# Patient Record
Sex: MALE | Employment: FULL TIME | URBAN - METROPOLITAN AREA
[De-identification: names, ages, dates, MRNs, and addresses within clinical notes are randomized per-mention and may not be internally consistent; named-entity substitution may affect disease eponyms.]

---

## 2022-08-31 NOTE — PROGRESS NOTES
"MEDICAL WEIGHT LOSS INITIAL EVALUATION  DIAGNOSIS:  Class II Obesity    NUTRITION HISTORY:  Breakfast: coffee + cream  Lunch: [place] or low calorie frozen entree@ noon-1  Dinner: [employer] 5X per week steak or walleye, baked potato, salad with blue cheese dressing-biggest meal of the day, dessert a few X per week @ 7 pm  Snacks: a few X per week-crackers/ cheese or strawberries  Beverage choices: 34 oz water, 2-3 cups coffee + cream, 1 can diet pop, 6-8 oz OJ, stopped ETOH 2 years ago  Dining out: [employer]-5X per week, 5X per month clients, 3X per month dines out  Binge eating: no  Emotional eating: no  Night time eating: no  Exercise: none, but has a bike and has a gym membership at [employer]  Other: Wife does all of the shopping (online) and cooking. She cooks 2 dinners and orders 5 meal per week form [employer] (for ~ 3 years) due to declining health. He has gained ~ 15 pounds in the past 3 years. Patient does not feel like they could do a \"\"meal kit\" program. 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 He need to lose ~50 pounds to get a knee replacement. He tried eating 1 meal/day and lost ~ 20 pounds, but felt it was not sustainable to be starving all day.      MEDICATION FOR WEIGHT LOSS:  Plans to start Topamax    ANTHROPOMETRICS:  Height: 6'  Weight: 275.3 lb   BMI: 37.34 kg/m2  NUTRITION DIAGNOSIS:   Obese, class II related to excess energy intake as evidence by BMI of  37.34  NUTRITION INTERVENTIONS  Nutrition Prescription:  Recommend modified energy- nutrient intake  Implementation:  Nutrition Education (Content):    Discussed my plate guidelines    Determined alternative to emotional eating    Reviewed  beverage choices    Provided: My Plate guidelines, Tips for Weight loss/ management, guidelines for selecting protein drinks      Nutrition Education (Application):     Patient to practice goals as stated below    Patient verbalizes understanding of " weight loss by wanting to reduce portions at dinner    Anticipate good compliance    Goals:  Increase water to 48-64 oz per day  Share an entree at dinner with spouse (pt likes this idea)  Have breakfast daily or a protein drink (try as replacement for cream in coffee)  Limit juice to no more than 4 oz per day        FOLLOW UP AND MONITORING:    Other  - follow up in 4 weeks.     TIME SPENT WITH PATIENT:   34 minutes   Pool Almaraz RD, LIN  [facility]

## 2022-09-09 ENCOUNTER — TELEPHONE (OUTPATIENT)
Dept: SURGERY | Facility: CLINIC | Age: 72
End: 2022-09-09

## 2022-09-09 NOTE — TELEPHONE ENCOUNTER
Sent link to email to get set up on [employer]    Left voicemail message for patient reminding them to complete the new patient questionnaire before their appointment.    Chasidy BAR MA

## 2022-09-12 ENCOUNTER — OFFICE VISIT (OUTPATIENT)
Dept: SURGERY | Facility: CLINIC | Age: 72
End: 2022-09-12
Payer: COMMERCIAL

## 2022-09-12 ENCOUNTER — ALLIED HEALTH/NURSE VISIT (OUTPATIENT)
Dept: SURGERY | Facility: CLINIC | Age: 72
End: 2022-09-12
Payer: COMMERCIAL

## 2022-09-12 VITALS
DIASTOLIC BLOOD PRESSURE: 92 MMHG | WEIGHT: 275.3 LBS | OXYGEN SATURATION: 100 % | SYSTOLIC BLOOD PRESSURE: 138 MMHG | HEIGHT: 72 IN | BODY MASS INDEX: 37.29 KG/M2 | HEART RATE: 74 BPM

## 2022-09-12 DIAGNOSIS — R73.03 PREDIABETES: ICD-10-CM

## 2022-09-12 DIAGNOSIS — G47.33 OSA (OBSTRUCTIVE SLEEP APNEA): ICD-10-CM

## 2022-09-12 DIAGNOSIS — K21.9 GASTROESOPHAGEAL REFLUX DISEASE, UNSPECIFIED WHETHER ESOPHAGITIS PRESENT: ICD-10-CM

## 2022-09-12 DIAGNOSIS — R63.5 WEIGHT GAIN: ICD-10-CM

## 2022-09-12 DIAGNOSIS — I10 ESSENTIAL HYPERTENSION: ICD-10-CM

## 2022-09-12 DIAGNOSIS — E66.01 MORBID OBESITY (H): Primary | ICD-10-CM

## 2022-09-12 DIAGNOSIS — E78.5 HYPERLIPIDEMIA, UNSPECIFIED HYPERLIPIDEMIA TYPE: ICD-10-CM

## 2022-09-12 PROCEDURE — 99205 OFFICE O/P NEW HI 60 MIN: CPT | Performed by: PHYSICIAN ASSISTANT

## 2022-09-12 PROCEDURE — 97802 MEDICAL NUTRITION INDIV IN: CPT | Performed by: DIETITIAN, REGISTERED

## 2022-09-12 RX ORDER — POLYETHYLENE GLYCOL 3350 17 G/17G
17 POWDER, FOR SOLUTION ORAL
COMMUNITY
Start: 2022-05-19

## 2022-09-12 RX ORDER — ATORVASTATIN CALCIUM 40 MG/1
TABLET, FILM COATED ORAL
COMMUNITY
Start: 2022-05-19

## 2022-09-12 RX ORDER — ASPIRIN 325 MG
325 TABLET, DELAYED RELEASE (ENTERIC COATED) ORAL
COMMUNITY

## 2022-09-12 RX ORDER — SILDENAFIL CITRATE 20 MG/1
20-60 TABLET ORAL
COMMUNITY
Start: 2022-05-19

## 2022-09-12 RX ORDER — MULTIPLE VITAMINS W/ MINERALS TAB 9MG-400MCG
1 TAB ORAL
COMMUNITY

## 2022-09-12 RX ORDER — METOPROLOL SUCCINATE 25 MG/1
25 TABLET, EXTENDED RELEASE ORAL
COMMUNITY
Start: 2022-05-19

## 2022-09-12 RX ORDER — TOPIRAMATE 25 MG/1
TABLET, FILM COATED ORAL
Qty: 90 TABLET | Refills: 1 | Status: SHIPPED | OUTPATIENT
Start: 2022-09-12 | End: 2022-12-12

## 2022-09-12 RX ORDER — LOSARTAN POTASSIUM 100 MG/1
100 TABLET ORAL
COMMUNITY
Start: 2022-05-19

## 2022-09-12 RX ORDER — IBUPROFEN 800 MG/1
800 TABLET, FILM COATED ORAL
COMMUNITY

## 2022-09-12 RX ORDER — ESCITALOPRAM OXALATE 10 MG/1
TABLET ORAL
COMMUNITY
Start: 2022-05-19

## 2022-09-12 ASSESSMENT — LIFESTYLE VARIABLES
AUDIT-C TOTAL SCORE: 0
HOW MANY STANDARD DRINKS CONTAINING ALCOHOL DO YOU HAVE ON A TYPICAL DAY: PATIENT DOES NOT DRINK
HOW OFTEN DO YOU HAVE SIX OR MORE DRINKS ON ONE OCCASION: NEVER
HOW OFTEN DO YOU HAVE A DRINK CONTAINING ALCOHOL: NEVER
SKIP TO QUESTIONS 9-10: 1

## 2022-09-12 NOTE — PATIENT INSTRUCTIONS
"Nice to talk with you today. Thank you for allowing me the privilege of caring for you. We hope we provided you with the excellent service you deserve.     To ensure the quality of our services you may receive a patient satisfaction survey from an independent monitoring company.  The greatest compliment you can give is \"Likely to Recommend\"    Below is our plan we discussed.-  EITAN Pérez      Plan:  Start Topiramate 25 mg tablets  1 tablet at night  in week 1  2 tablets at night in week 2  3 tablets at night in week 3 and beyond    Labs have been ordered for you. You can have these drawn at any [facility] lab.  Please call [phone] to set up a lab appt. Results will post to [employer] when complete.  Once all completed, I will review, and write you a note explaining what we find.      Goals:  Talk with wife about changing work schedule.    Start work earlier   Work out in afternoon    FOLLOW-UP:    Please call [phone] to schedule your next visit in 8- 10 weeks with Elisa Funez PA-C      TOPIRAMATE (TOPAMAX)    We are considering starting topiramate at bedtime.  Start one tab, 25 mg, for a week. Go up to 50 mg (2 tabs) for the next week. At the third week, take  3 tabs (75 mg).  Stay at 3 tabs until you are seen again.       Topiramate (Topamax) is a medication that is used most often to treat migraine headaches or for seizures. It has also been found to help with weight loss. Although it's not currently FDA approved for weight loss, it has been used safely for a number of years to help people who are carrying extra weight.     Just how topiramate helps with weight loss has not been exactly determined. However it seems to work on areas of the brain to quiet down signals related to eating.      Topiramate may make you:    >feel less interest in eating in between meals   >think less about food and eating   >find it easier to push the plate away   >find giving up pop easier    >have an easier time eating " less    For some of our patients, the pills work right away. They feel and think quite differently about food. Other patients don't feel much of a change but find in fact they have lost weight! Like all weight loss medications, topiramate works best when you help it work.  This means:    1) Have less tempting high calorie (fattening) food around the house or office    2) Have lower calorie food (fruits, vegetables,low fat meats and dairy) for snacks    3) Eat out only one time or less each week.   4) Eat your meals at a table with the TV or computer off.    Side-effects. Topiramate is generally well tolerated. The main side-effects we see are:   Tingling in hands,feet, or face (usually not very troublesome)   Mental confusion and word finding trouble (about 10% of patients have this.)     Feeling sleepy or a bit dopey- this goes away very soon after starting.      For any questions or concerns please send a [employer] message to our team or call our weight management call center at [phone] during regular business hours. For questions during evenings or weekends your messages will be addressed during the next business day.  For emergencies please call 911 or seek immediate medical care.     (Do not stop taking it if you don't think it's working. For some people it works even though they do not feel much different.)

## 2022-09-12 NOTE — PROGRESS NOTES
"New Medical Weight Management Consult    PATIENT:  Franco Wheeler   MRN:         [id number]   :         1950  MATT:         2022      Dear Lindy Purdy MD,    I had the pleasure of seeing your patient, Franco Wheeler. Full intake/assessment was done to determine barriers to weight loss success and develop a treatment plan. Franco Wheeler is a 71 year old male interested in treatment of medical problems associated with excess weight. He has a height of 6' 0\", a weight of 275 lbs 4.8 oz, and the calculated Body mass index is 37.34 kg/m .    Assessment & Plan   Problem List Items Addressed This Visit       Essential hypertension    Relevant Medications    losartan (COZAAR) 100 MG tablet    Other Relevant Orders    Comprehensive metabolic panel [LAB17] (Future)    Morbid obesity (H) - Primary    Relevant Medications    topiramate (TOPAMAX) 25 MG tablet    Other Relevant Orders    Comprehensive metabolic panel [LAB17] (Future)    Vitamin D Deficiency [HUS774] (Future)    Hemoglobin A1c [LAB90] (Future)    TSH with free T4 reflex    Hyperlipidemia, unspecified hyperlipidemia type    Relevant Medications    atorvastatin (LIPITOR) 40 MG tablet    Other Relevant Orders    Comprehensive metabolic panel [LAB17] (Future)    Gastroesophageal reflux disease, unspecified whether esophagitis present    Relevant Medications    omeprazole (PRILOSEC) 20 MG DR capsule    polyethylene glycol (MIRALAX) 17 GM/Dose powder    Prediabetes    Relevant Orders    Hemoglobin A1c [LAB90] (Future)    MARTI (obstructive sleep apnea)          Other Visit Diagnoses       Weight gain        Relevant Orders    TSH with free T4 reflex             PROGRAM OVERVIEW  Reviewed options at [facility] including provider visits and the dietician.  All questions about weight loss program were answered.    SURGICAL WEIGHT LOSS   Option presented given pt BMI and current comorbid conditions. No current interest.   " "  MEDICATIONS:  We discussed healthy habits to assist with weight loss. We reviewed medications associated with weight gain. We discussed the role of pharmacological agents in the treatment of obesity and the \"off-label\" use of medications in this practice. We reviewed medication that may assist with weight loss. Indications, contraindications, risks/benefits, and potential side effects were discussed.   Topiramate was prescribed. Discussed that medications must always be used together with lifestyle changes such as improvements in diet choices, portion control and establishing and maintaining a regular exercise program.     AOM:  Phentermine:  Pt has hx of arrhythmia.    Topiramate:  Will start today  GLP-1:    AOM not covered.  Will get lab today.   Naltrexone:  Consideration, Hx of alcohol abuse  Wellbutrin:  Hx of alcohol abuse, would avoid due to increase seizure threshold  Metformin:  Will get lab doday  Contrave: AOM not covered.  Qsymia: AOM not covered.          CURRENT GOALS:  (allow time to get activity back into daily routine)  Talk with wife about changing work schedule.    Start work earlier   Work out in afternoon     Restart BP medications  Return to sleep clinic    Follow up: Please call [phone] to schedule your next visit in 8- 10 weeks with Elisa Funez PA-C    72 minutes spent on the date of the encounter doing chart review, history and exam, review test results, counseling, developing plan of care, documentation, and further activities as noted above.       Weight Related Co-morbidities:          I have the following health issues associated with obesity: HTN, High Cholesterol, MARTI   I have the following symptoms associated with obesity: Knee Pain     Patient Active Problem List   Diagnosis    Essential hypertension    Morbid obesity (H)    Hyperlipidemia, unspecified hyperlipidemia type    Gastroesophageal reflux disease, unspecified whether esophagitis present    Prediabetes    MARTI " "(obstructive sleep apnea)       Weight History    Previously had weight loss surgery: no   The following factors contributed to weight gain:    Eating habits, decrease in exercise   I have tried the following methods to lose weight:   Physician directed weight loss- counting calories, weight loss medication, exercise   My lowest weight since age 17 was: 300   My highest weight since age 17 was: 210   The most weight I have ever lost was: (lbs) 25 lb   Orthopedic surgeon wants him to lose 50 lbs in order to get knee surgery.   He did physician directed weight loss in past with Nichelle Lazaro.  Lost about 25 lbs.  Interested in doing this again.   For the last 3 months he has been dieting on his own he lost about 15-20 lbs and gained about 5 back.   Would like to lose more and get to 190.  He has HTN and \"thickening of his heart\" and a knee problem.      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  Son works with him as does his wife but they work part time.  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  Would like to lose weight to get back to this.     During Covid had increase alcohol use and Alcohol Use Disorder. PCP intervention performed. Read AA blue book.  Has been alcohol free for the last 2 years.         Diet Recall     Wake Up: 7:00   Breakfast Coffee with Cream 7:00 am  100 aclories   Lunch 1000 calories    Supper; Yogurt    Bedtime:   Snack between meals:   Snack in evening:    10:00             Typical snacks:    Caloric beverages per day. What type: Juice- OJ 1 glass   Water consumed daily: none   Low heydi/diet drinks:   Alcohol:   Foods you crave:    1 diet root beer   None from last 2 years   None (does eat sweets because they are in the house)      Wife is type 1 Diabetic so she has Crackers, juice around in case she needs these for lows.      In the last 2-3 weeks increased his calories to " 2500 calories was eating 2 meals a day Lunch and dinner and then having sweets.        Sleep History    How many hours do you sleep at night? 9 hours   Do you think you have sleep apnea?  Previously    Do you snore so loudly some one can hear you through a closed door? Yes   Has anyone seen or heard you stop breathing during your sleep?  Not since stopping alcohol   Do you often feel tired, fatigued, or sleepy during the day? Not       Eating Habits (Yes/No) (Never, Daily, Several Days, Weekly,Monthly)   Eat fast food  4x a week      Eat Take out/sit-down restaurant. 5x a week      Eat most meals in front of screens Always      Skip meals Yes   Grazes all day Modest Grazer      Snacks between meals. No      Eat most food at end of day. Yes, usually dinner with wife      Eat in middle of night  No      Eat  to prevent or correct low blood sugar. No      Eat to prevent GERD No      Worry about not having enough food to eat. No      Constant Dieter No      I emotionally eat. (stressed, depressed, anxious, bored) No      I feel hungry all the time-even if I just have eaten. No      Feeling full is important to me. Yes      I finish all the food on my plate-even if I am already full. Yes      I eat/snack without noticing that I am eating. No      I eat when I am preparing the meal. No      I have trouble not eating junk if they are around the house. Yes      I think about food all day. No      Who does the grocery shopping? Wife   Who does the cooking? Wife 2 days home cooking, 5 days take out       Eating Disorder Screen    I binge eat No      I make myself vomit what I have eaten or use laxatives to get rid of food. No   I eat a large amount of food, like a loaf of bread, a box of cookies, a pint/quart of ice cream, all at once. No   I eat a large amount of food even when I am not hungry. No   I eat rapidly. No   I eat alone because I feel embarrassed and do not want others to see how much I have eaten. No   I eat  until I am uncomfortably full. No   I feel bad, disgusted, or guilty after I overeat. No   I make myself vomit what I have eaten or use laxatives to get rid of food. No       Activity/Exercise History    How much of a typical 12 hour day do you spend sitting or lying down?    How many hours of screen time do you have daily?    How many times a week are you active for the purpose of exercise? None   What do you do for exercise? None   For how long to your exercise for?    What keeps you from being more active? Too Busy due to work., Knee pain     Bike works best because it is pain free and fun.  He rides on the bike path.      No past medical history on file.       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 All day at work    How many hours do you spend commuting to work daily?  20 minutes twice daily   What is your marital status?    If in a relationship, is your significant other overweight?    Do you have children? Yes   If you have children, are they overweight?    Who do you live with?  Wife, 3 dogs   Are they supportive of your health goals?    Who does the food shopping?       Social History     Tobacco Use    Smoking status: Never Smoker    Smokeless tobacco: Never Used   Substance Use Topics    Alcohol use: No     , none currently for the past 2 years.     Drug use: No        Mental Health History Reviewed With Patient    How often in the past 2 weeks have you felt little interest or pleasure in doing things? None   Over the past 2 weeks how often have you felt down, depressed, or hopeless? Seldom       MEDICATIONS:   Current Outpatient Medications   Medication    aspirin (ASA) 325 MG EC tablet    atorvastatin (LIPITOR) 40 MG tablet    escitalopram (LEXAPRO) 10 MG tablet    ibuprofen (ADVIL/MOTRIN) 800 MG tablet    losartan (COZAAR) 100 MG tablet    metoprolol  succinate ER (TOPROL XL) 25 MG 24 hr tablet    multivitamin w/minerals (THERA-VIT-M) tablet    nirmatrelvir and ritonavir (PAXLOVID) therapy pack    omeprazole (PRILOSEC) 20 MG DR capsule    polyethylene glycol (MIRALAX) 17 GM/Dose powder    sildenafil (REVATIO) 20 MG tablet    topiramate (TOPAMAX) 25 MG tablet     No current facility-administered medications for this visit.        ALLERGIES:      Allergies   Allergen Reactions    Penicillins         ROS:    HEENT  H/O glaucoma:  no  Cardiovascular  CAD:   no  Palpitations:   yes  HTN:    Yes, has been out of medication for last week.    Gastrointestinal  GERD:   yes  Constipation:   Yes, controlled with fiber  Liver Dz:   no  H/O Pancreatitis:  no  H/O Gallbladder Dz: no  Psychiatric  Moods Stable:  yes  Anxiety:   no  Depression:  yes  Bipolar:  no  H/O ETOH/Drug Use yes  H/O eating disorder: no  Endocrine  PMH/FMH of MTC or MEN2:  no  Neurologic:  H/O seizures:   no  Headaches:  no  Memory Impairment:  no    H/O kidney stones:  no  Kidney disease:  no    Labs/Records Reviewed:  No results found for: A1C, VITDT, TSH, NA, POTASSIUM, CHLORIDE, CO2, ANIONGAP, GLC, BUN, CR, GFRESTIMATED, EVELIN, BILITOTAL, ALKPHOS, ALT, AST, CHOL, HDL, LDL, TRIG, WBC, HGB, HCT, MCV, PLT      PHYSICAL EXAM:  BP (!) 138/92   Pulse 74   Ht 6' (1.829 m)   Wt 275 lb 4.8 oz (124.9 kg)   SpO2 100%   BMI 37.34 kg/m    GENERAL: Healthy, alert and no distress  EYES: Eyes grossly normal to inspection.  No discharge or erythema, or obvious scleral/conjunctival abnormalities.  RESP: No audible wheeze, cough, or visible cyanosis.  No visible retractions or increased work of breathing.    SKIN: Visible skin clear. No significant rash, abnormal pigmentation or lesions.  NEURO: Cranial nerves grossly intact.  Mentation and speech appropriate for age.  PSYCH: Mentation appears normal, affect normal/bright, judgement and insight intact, normal speech and appearance well-groomed.    COUNSELING:    Reviewed obesity as a chronic disease and comprehensive management stratagies.      We discussed Bariatric Basics including:  -eating 3 meals daily  -eating protein first  -eating slowly, chewing food well  -avoiding/limiting calorie containing beverages  -limiting carbohydrates and changing to whole grains  -limiting restaurant or cafeteria eating to twice a week or less    We discussed the importance of restorative sleep and stress management in maintaining a healthy weight.  We discussed insulin resistance and glycemic index as it relates to appetite and weight control.   We discussed the importance of physical activity including cardiovascular and strength training in maintaining a healthier weight and explored viable options.  Patient education of above written in AVS.      Sincerely,      Elisa Funez PA-C

## 2022-10-25 ENCOUNTER — LAB (OUTPATIENT)
Dept: LAB | Facility: CLINIC | Age: 72
End: 2022-10-25
Payer: COMMERCIAL

## 2022-10-25 DIAGNOSIS — R73.03 PREDIABETES: ICD-10-CM

## 2022-10-25 DIAGNOSIS — I10 ESSENTIAL HYPERTENSION: ICD-10-CM

## 2022-10-25 DIAGNOSIS — E66.01 MORBID OBESITY (H): ICD-10-CM

## 2022-10-25 DIAGNOSIS — R63.5 WEIGHT GAIN: ICD-10-CM

## 2022-10-25 DIAGNOSIS — E78.5 HYPERLIPIDEMIA, UNSPECIFIED HYPERLIPIDEMIA TYPE: ICD-10-CM

## 2022-10-25 LAB — HBA1C MFR BLD: 5.8 % (ref 0–5.6)

## 2022-10-25 PROCEDURE — 80053 COMPREHEN METABOLIC PANEL: CPT

## 2022-10-25 PROCEDURE — 36415 COLL VENOUS BLD VENIPUNCTURE: CPT

## 2022-10-25 PROCEDURE — 83036 HEMOGLOBIN GLYCOSYLATED A1C: CPT

## 2022-10-25 PROCEDURE — 84443 ASSAY THYROID STIM HORMONE: CPT

## 2022-10-25 PROCEDURE — 82306 VITAMIN D 25 HYDROXY: CPT

## 2022-10-26 LAB
ALBUMIN SERPL-MCNC: 3.8 G/DL (ref 3.4–5)
ALP SERPL-CCNC: 76 U/L (ref 40–150)
ALT SERPL W P-5'-P-CCNC: 28 U/L (ref 0–70)
ANION GAP SERPL CALCULATED.3IONS-SCNC: 7 MMOL/L (ref 3–14)
AST SERPL W P-5'-P-CCNC: 16 U/L (ref 0–45)
BILIRUB SERPL-MCNC: 0.4 MG/DL (ref 0.2–1.3)
BUN SERPL-MCNC: 17 MG/DL (ref 7–30)
CALCIUM SERPL-MCNC: 8.6 MG/DL (ref 8.5–10.1)
CHLORIDE BLD-SCNC: 110 MMOL/L (ref 94–109)
CO2 SERPL-SCNC: 24 MMOL/L (ref 20–32)
CREAT SERPL-MCNC: 0.84 MG/DL (ref 0.66–1.25)
DEPRECATED CALCIDIOL+CALCIFEROL SERPL-MC: 27 UG/L (ref 20–75)
GFR SERPL CREATININE-BSD FRML MDRD: >90 ML/MIN/1.73M2
GLUCOSE BLD-MCNC: 94 MG/DL (ref 70–99)
POTASSIUM BLD-SCNC: 4 MMOL/L (ref 3.4–5.3)
PROT SERPL-MCNC: 7.5 G/DL (ref 6.8–8.8)
SODIUM SERPL-SCNC: 141 MMOL/L (ref 133–144)
TSH SERPL DL<=0.005 MIU/L-ACNC: 1.21 MU/L (ref 0.4–4)

## 2022-10-27 NOTE — RESULT ENCOUNTER NOTE
Pavel Gamez,    I enjoyed meeting you.  I reviewed your lab results.        Pavel Gamez,    I enjoyed meeting you.  I reviewed your lab results.      Your Vitamin D is low.  This means you might be having trouble getting your calcium absorbed from you food.  Please start 2000 Int Units daily.      Your TSH shows your thyroid function is normal.    Your CMP shows your kidney function, electrolytes, glucose level, and liver function is normal.     Your HgA1C shows your blood sugars are high and are causing prediabetes.  Prediabetes puts you at increased risk of developing type II diabetes.     Insulin is a hormone made by your pancreas that acts like a key to let blood sugar into cells for use as energy. If you have prediabetes, the cells in your body don't respond normally to insulin. Your pancreas makes more insulin to try to get cells to respond. Eventually your pancreas can't keep up, and your blood sugar rises, setting the stage for prediabetes-and type 2 diabetes down the road.    What this means for your weight?  With insulin resistance your body has a hard time absorbing that sugar and utilizing it as fuel. Instead that glucose (sugar) get pushed to your fat cells and stored there. As you continue to eat when you insulin resistant, that fuel continually gets stored as fat. Also, your pancreas needs to work harder to pump out more insulin to get your blood sugars down. More insulin means more fat storage.     There are medications we can use that target insulin resistance and also preserve the cells that make insulin.  They also can help with weight loss.  These are metformin and a class of medications called GLP-1 agonists.  We can talk about these at your next visit.     Stay safe, stay well.    EITAN Pérez    * For additional lab test information, labtestsonline.org is an good reference

## 2022-11-05 NOTE — PROGRESS NOTES
MEDICAL WEIGHT LOSS FOLLOW UP      DIAGNOSIS:  Class II Obesity    NUTRITION HISTORY:    Breakfast: Muscle milk     Lunch:  Take out 5X per week-salad and bowl chili or 3 tacos or omelette with toast     Dinner:  Cat fish, red beans or 2 slices cheese, apple     Snacks:  will eat candy (Halloween) if it is around, but generally no snacking     Beverage Choices:  2-16.9 oz Water, 3 cups of coffee, 1 protein drink, 12 oz diet or regular, 12 oz diet 7 up, occasional orange juice every other day     Exercise: stationary bike 3X per week-30 minutes     Other: Working to get weight down to 230 to have a knee replacement. Patient says never gets thirsty for water, but thinks if he cuts back on pop he can drink more water. Pt want to start brining a lunch from home daily. He is trying to eat smaller portions at dinner and has cut way back on dining out for dinner.     ANTHROPOMETRICS:    Initial Weight: 275.3 pounds    Height: 6'    Current Weight: 269.1 pounds    Weight Change:  decrease 6.2 pounds    BMI: 36.50 kg/m2    MEDICATIONS:  Topamax (went down to 1 tablet-plans to discuss this with he provider at December visit)    EVALUATION/PROGRESS TOWARDS GOALS:  Previous Goals:  Increase water to 48-64 oz per day-not met  Share an entree at dinner with spouse (pt likes this idea)-met/ quit take out meals for dinner  Have breakfast daily or a protein drink (try as replacement for cream in coffee)-met  Limit juice to no more than 4 oz per day-met      Previous Nutrition Diagnosis:  Obese class II related to excess energy intake as evidence by BMI of 37.34 kg/m2     Current Nutrition Diagnosis:   Obese class II related to excess energy intake as evidence by BMI of 36.50 kg/m2  No change      INTERVENTION:    Nutrition Prescription:  Recommend modified nutrient intake by decreasing energy intake    Implementation:    Meals and Snacks: 3/0-1    Nutrition Education (Content):    Discussed previous goals and determined new  goals    Encourage physical activity    Supported patient in attempted weight loss and behavior changes     Congratulated patient on successful weight loss     Patient verbalizes understanding of weight loss by wanting to cut back on dinining out further.    Anticipate fair-good compliance    Goals:  Bring lunch to work daily  Limit pop to 2 servings/ week  Increase water to 50 oz per day      Follow Up/Monitoring:  Other  -  patient to follow up in 8 weeks    Time Spent With Patient:  26 Minutes  Pool Almaraz RD, LD  [facility]

## 2022-11-08 ENCOUNTER — ALLIED HEALTH/NURSE VISIT (OUTPATIENT)
Dept: SURGERY | Facility: CLINIC | Age: 72
End: 2022-11-08
Payer: COMMERCIAL

## 2022-11-08 VITALS — BODY MASS INDEX: 36.5 KG/M2 | WEIGHT: 269.1 LBS

## 2022-11-08 PROCEDURE — 97803 MED NUTRITION INDIV SUBSEQ: CPT | Performed by: DIETITIAN, REGISTERED

## 2022-12-08 NOTE — PROGRESS NOTES
2022    Return Medical Weight Management Note     Franco Wheeler  MRN:  [id number]  :  1950      Dear Lindy Purdy,    I had the pleasure of doing a visit with your patient Franco Wheeler.  He is a 72 year old male who I am continuing to see for weight management.     Assessment & Plan   Problem List Items Addressed This Visit     Morbid obesity (H) - Primary     Patient was congratulated on wt loss success thus far. Healthy habits to assist with further weight loss were discussed. Franco will continue the Topiramate but increase his dose to 25 mg twice daily.  WE dicussed phentermine today.  With his RBBB I am not sure he is a candidate.  Will send note to cardiologist to ask opinion. Pt has pre-diabetes and would most likely benefit from metformin addition.  Will consider at follow up visit or if phentermine not a possibility.     Goals:   Will take lunch to work  Will eat supper  Will work on plate method for eating.           Relevant Medications    topiramate (TOPAMAX) 25 MG tablet    Prediabetes     May improve with weight loss.  Will consider metformin as add on medication if needed.               PATIENT INSTRUCTIONS:  Increase Topiramate to 25 mg twice daily.     Goals:  Will take lunch to work  Will eat supper  Will work on plate method for eating.  Will increase activity from 1 hour 5x day a week of cardio with 15 minutes to weight.      FOLLOW-UP:    Please call [phone] to schedule your next visit in 3 months.  See dietician this month.       34 minutes spent on the date of the encounter doing chart review, history and exam, result review, counseling, developing plan of care, documentation, and further activities as noted      INTERVAL HX: Franco returns for medical weight management follow up.  Last seen in 2022.  Was started on Topiramate.  Was going to Restart BP medications and Return to sleep clinic.  Ramped up to 75 mg of Topiramate but had some side effects  so decreased to 25 mg at night.  He has less snacking. Is not having sweets after dinner.  (Did have the halloween candy but that is now out of the house.)  Is having a significant amount of less soda.       Wants to get down to 230 lbs to have his knee surgery.    Goals:   Work out in afternoon - completed    Diet Recall  Breakfast: Has muscle milk in working  Take out at lunch: Salad with meat or Steak and Eggs from [employer]  Supper:  Tried to avoid supper.  Potato sausage and mashed potato.  Small charles salad.     WEIGHT METRICS:  Body mass index is 35.87 kg/m .   Current Weight: 264 lb 8 oz (120 kg)  Last Visits Weight: 275 lb 4.8 oz (124.9 kg)  Initial Weight (lbs): 275.3 lbs  Cumulative weight loss (lbs): 10.8  Weight Loss Percentage: 3.92%    Wt Readings from Last 10 Encounters:   12/12/22 264 lb 8 oz (120 kg)   11/08/22 269 lb 1.6 oz (122.1 kg)   09/12/22 275 lb 4.8 oz (124.9 kg)        Weight Loss Medication (AOM) History Reviewed       Current weight loss medication/s taking:    Topiramate   If not taking an AOM prescribed to you, please indicate why: On 75 mg dose has SE so decreased to 25 mg dose.    Any side effects from the medication?   A little more irritable according to wife.         Changes and Difficulties      Diet changes since last visit:   Having muscle milk in the morning.    With regards to diet, pt still struggling with:   Always having night meal, eating too much at lunch   Activity changes since last visit:   5 days a week ofr cardio with 15 min of weight after   With regards to activity, pt still struggling with: None       MEDICATIONS:   Current Outpatient Medications   Medication     aspirin (ASA) 325 MG EC tablet     atorvastatin (LIPITOR) 40 MG tablet     escitalopram (LEXAPRO) 10 MG tablet     ibuprofen (ADVIL/MOTRIN) 800 MG tablet     losartan (COZAAR) 100 MG tablet     metoprolol succinate ER (TOPROL XL) 25 MG 24 hr tablet     multivitamin w/minerals (THERA-VIT-M) tablet      nirmatrelvir and ritonavir (PAXLOVID) therapy pack     omeprazole (PRILOSEC) 20 MG DR capsule     polyethylene glycol (MIRALAX) 17 GM/Dose powder     sildenafil (REVATIO) 20 MG tablet     topiramate (TOPAMAX) 25 MG tablet     No current facility-administered medications for this visit.       LABS:  Hemoglobin A1C   Date Value Ref Range Status   10/25/2022 5.8 (H) 0.0 - 5.6 % Final     Comment:     Normal <5.7%   Prediabetes 5.7-6.4%    Diabetes 6.5% or higher     Note: Adopted from ADA consensus guidelines.     Vitamin D, Total (25-Hydroxy)   Date Value Ref Range Status   10/25/2022 27 20 - 75 ug/L Final     Sodium   Date Value Ref Range Status   10/25/2022 141 133 - 144 mmol/L Final     Potassium   Date Value Ref Range Status   10/25/2022 4.0 3.4 - 5.3 mmol/L Final     Chloride   Date Value Ref Range Status   10/25/2022 110 (H) 94 - 109 mmol/L Final     Carbon Dioxide (CO2)   Date Value Ref Range Status   10/25/2022 24 20 - 32 mmol/L Final     Anion Gap   Date Value Ref Range Status   10/25/2022 7 3 - 14 mmol/L Final     Glucose   Date Value Ref Range Status   10/25/2022 94 70 - 99 mg/dL Final     Urea Nitrogen   Date Value Ref Range Status   10/25/2022 17 7 - 30 mg/dL Final     Creatinine   Date Value Ref Range Status   10/25/2022 0.84 0.66 - 1.25 mg/dL Final     GFR Estimate   Date Value Ref Range Status   10/25/2022 >90 >60 mL/min/1.73m2 Final     Comment:     Effective December 21, 2021 eGFRcr in adults is calculated using the 2021 CKD-EPI creatinine equation which includes age and gender (Mary et al., NEJM, DOI: 10.1056/UEBYtz8119640)     Calcium   Date Value Ref Range Status   10/25/2022 8.6 8.5 - 10.1 mg/dL Final     Bilirubin Total   Date Value Ref Range Status   10/25/2022 0.4 0.2 - 1.3 mg/dL Final     Alkaline Phosphatase   Date Value Ref Range Status   10/25/2022 76 40 - 150 U/L Final     ALT   Date Value Ref Range Status   10/25/2022 28 0 - 70 U/L Final     AST   Date Value Ref Range Status    10/25/2022 16 0 - 45 U/L Final        BP Readings from Last 6 Encounters:   12/12/22 116/76   09/12/22 (!) 138/92       Pulse Readings from Last 6 Encounters:   12/12/22 55   09/12/22 74        PE:  /76   Pulse 55   Ht 6' (1.829 m)   Wt 264 lb 8 oz (120 kg)   SpO2 99%   BMI 35.87 kg/m    GENERAL: Healthy, alert and no distress  EYES: Eyes grossly normal to inspection.  No discharge or erythema, or obvious scleral/conjunctival abnormalities.  CV: Regular rate and rhythm without murmurs, rubs, or gallops.  RESP: Lungs are clear to auscultation bilaterally with good breath sounds. No audible wheeze, cough, or visible cyanosis.  No visible retractions or increased work of breathing.     SKIN: Visible skin clear. No significant rash, abnormal pigmentation or lesions.  NEURO: Cranial nerves grossly intact.  Mentation and speech appropriate for age.  PSYCH: Mentation appears normal, affect normal/bright, judgement and insight intact, normal speech and appearance well-groomed.    Sincerely,      Elisa Funez PA-C

## 2022-12-12 ENCOUNTER — OFFICE VISIT (OUTPATIENT)
Dept: SURGERY | Facility: CLINIC | Age: 72
End: 2022-12-12
Payer: COMMERCIAL

## 2022-12-12 VITALS
HEART RATE: 55 BPM | DIASTOLIC BLOOD PRESSURE: 76 MMHG | OXYGEN SATURATION: 99 % | BODY MASS INDEX: 35.83 KG/M2 | WEIGHT: 264.5 LBS | HEIGHT: 72 IN | SYSTOLIC BLOOD PRESSURE: 116 MMHG

## 2022-12-12 DIAGNOSIS — R73.03 PREDIABETES: ICD-10-CM

## 2022-12-12 DIAGNOSIS — E66.01 MORBID OBESITY (H): Primary | ICD-10-CM

## 2022-12-12 PROCEDURE — 99214 OFFICE O/P EST MOD 30 MIN: CPT | Performed by: PHYSICIAN ASSISTANT

## 2022-12-12 RX ORDER — TOPIRAMATE 25 MG/1
TABLET, FILM COATED ORAL
Qty: 180 TABLET | Refills: 1 | Status: SHIPPED | OUTPATIENT
Start: 2022-12-12 | End: 2023-05-22

## 2022-12-12 NOTE — ASSESSMENT & PLAN NOTE
Patient was congratulated on wt loss success thus far. Healthy habits to assist with further weight loss were discussed. Franco will continue the Topiramate but increase his dose to 25 mg twice daily.  WE dicussed phentermine today.  With his RBBB I am not sure he is a candidate.  Will send note to cardiologist to ask opinion. Pt has pre-diabetes and would most likely benefit from metformin addition.  Will consider at follow up visit or if phentermine not a possibility.     Goals:   Will take lunch to work  Will eat supper  Will work on plate method for eating.

## 2022-12-12 NOTE — PATIENT INSTRUCTIONS
"Nice to talk with you today. Thank you for allowing me the privilege of caring for you. We hope we provided you with the excellent service you deserve.     To ensure the quality of our services you may receive a patient satisfaction survey from an independent monitoring company.  The greatest compliment you can give is \"Likely to Recommend\"    Below is our plan we discussed.-  EITAN Pérez      Plan:  Increase Topiramate to 25 mg twice daily.     Goals:  Will take lunch to work  Will eat supper  Will work on plate method for eating.  Will increase activity from 1 hour 5x day a week of cardio with 15 minutes to weight.      FOLLOW-UP:    Please call [phone] to schedule your next visit.     "

## 2023-01-11 NOTE — PROGRESS NOTES
"MEDICAL WEIGHT LOSS FOLLOW UP      DIAGNOSIS:  Class II Obesity    NUTRITION HISTORY:    Breakfast: skips 3 days per week or protein drink      Lunch:  Take out-bond salad or spaghetti with meat balls, bread-big portion or club sandwich     Dinner:  Skips for the past 2 weeks     Snacks:  0-1X per day-Cheese/ crackers if skips dinner or 2 oranges or coffee cake or cookies (if in the house)     Beverage Choices:  20 oz water, 32 oz coffee + half and half, 3- 16 oz bottles apple juice per week,no ETOH     Exercise: 5X per week-stationary bike 60 minutes/strength training     Other: Weight was down to 260 lb, but started to gain weight. Has stopped eating dinner and wanted to know my opinion of that. Explained that meal skipping generally does not support long term weight loss. Eating 3 meals per day at regular intervals may help with \"staying ahead of hunger.\" Patient would like to get down to 238 by the end of May in order to have knee surgery. Patient had many appropriate questions.     ANTHROPOMETRICS:    Initial Weight: 275.3 pounds    Previous Weight:  269.1 pounds    Current Weight:  265.4 pounds    Weight Change:  decrease 3.7 pounds    BMI: 35.99 kg/m2    MEDICATIONS:  Topamax    EVALUATION/PROGRESS TOWARDS GOALS:  Previous Goals:  Bring lunch to work daily-met  Limit pop to 2 servings/ week-met  Increase water to 50 oz per day-not met    Previous Nutrition Diagnosis:  Obese class II related to excess energy intake as evidence by BMI of 36.50 kg/m2     Current Nutrition Diagnosis:   Obese class II related to excess energy intake as evidence by BMI of 35.99 kg/m2  No change      INTERVENTION:    Nutrition Prescription:  Recommend modified nutrient intake by decreasing energy intake    Implementation:    Meals and Snacks: 3/0-1    Nutrition Education (Content):    Discussed previous goals and determined new goals    Encourage physical activity    Supported patient in attempted weight loss and behavior " changes     Congratulated patient on successful weight loss     Patient verbalizes understanding of weight loss , by wanting to make several behavior changes to expedite weight loss    Anticipate good compliance    Goals:  Increase water to 48-64 oz/day and decrease juice to 1 bottle per week  Eliminate half and half in coffee  Eat smaller portion of take out meal at lunch/ eat dinner daily  Have protein drink daily  or eat breakfast    Follow Up/Monitoring:  Other  -  patient to follow up in 4 weeks    Time Spent With Patient:  28 Minutes  Pool Almaraz RD, LD  [facility]

## 2023-01-16 ENCOUNTER — ALLIED HEALTH/NURSE VISIT (OUTPATIENT)
Dept: SURGERY | Facility: CLINIC | Age: 73
End: 2023-01-16
Payer: COMMERCIAL

## 2023-01-16 VITALS — HEIGHT: 72 IN | WEIGHT: 265.4 LBS | BODY MASS INDEX: 35.95 KG/M2

## 2023-01-16 PROCEDURE — 97803 MED NUTRITION INDIV SUBSEQ: CPT | Performed by: DIETITIAN, REGISTERED

## 2023-01-16 NOTE — PATIENT INSTRUCTIONS
Pavel Gamez--   It was great to visit with you and learn about your progress. Below are the goals we discussed.  Goals:  Increase water to 48-64 oz/day and decrease juice to 1 bottle per week  Eliminate half and half in coffee  Eat smaller portion of take out meal at lunch/ eat dinner daily  Have protein drink daily  or eat breakfast    Please call [phone] to schedule your next visit with a Dietitian in 1 month  Thanks!  Pool Almaraz RD, LD  [facility]

## 2023-02-07 NOTE — PROGRESS NOTES
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 Patient brought in letter from his provider and wanted to know if he should take an additional 2000 international unit(s) vitamin D/day. He repots getting 1000 international unit(s) vitamin D in multivitamin/ mineral. Advised patient to start an additional 2000 international unit(s) vitamin D per letter from provider.     ANTHROPOMETRICS:    Initial Weight: 275.3 pounds    Previous Weight: 265.4 pounds     Height: 6'    Current Weight:  261 pounds    Weight Change: decrease 4.4 pounds    BMI: 35.40 kg/m2    MEDICATIONS:  Topamax    EVALUATION/PROGRESS TOWARDS GOALS:  Previous Goals:  Increase water to 48-64 oz/day and decrease juice to 1 bottle per week-partially met  Eliminate half and half in coffee-met  Eat smaller portion of take out meal at lunch/ eat dinner daily-not met  Have protein drink daily  or eat breakfast-improving    Previous Nutrition Diagnosis:  Obese class II related to excess energy intake as evidence by BMI of 35.99 kg/m2     Current Nutrition Diagnosis:   Obese class II related to excess energy intake as evidence by BMI of 35.40 kg/m2  No change      INTERVENTION:    Nutrition Prescription:  Recommend modified nutrient intake by decreasing energy intake    Implementation:    Meals and Snacks: 3/1    Nutrition Education (Content):    Discussed previous goals and  determined new goals    Encourage physical activity    Supported patient in attempted weight loss and behavior changes     Congratulated patient on successful weight loss     Patient verbalizes understanding of weight loss by wanting to work on better hydration    Anticipate good compliance    Goals:  Start 2000 international unit(s) vitamin D as recommended by provider in a letter  Continue to aim for 48 oz water or enhanced water (Casey, Crystal light, Hint water, Zero calorie sport drinks)  Reduce take out lunches to 2 times per week and try to make lower calorie choice (look at online menu when possible)    Follow Up/Monitoring:  Other  -  patient to follow up in 8 weeks (will see provider next month)    Time Spent With Patient:  28 Minutes  Pool Almaraz RD, LD  [facility]

## 2023-02-15 ENCOUNTER — OFFICE VISIT (OUTPATIENT)
Dept: SURGERY | Facility: CLINIC | Age: 73
End: 2023-02-15
Payer: COMMERCIAL

## 2023-02-15 VITALS — WEIGHT: 261 LBS | BODY MASS INDEX: 35.4 KG/M2

## 2023-02-15 DIAGNOSIS — E66.9 OBESITY: Primary | ICD-10-CM

## 2023-02-15 PROCEDURE — 97803 MED NUTRITION INDIV SUBSEQ: CPT | Performed by: DIETITIAN, REGISTERED

## 2023-02-15 PROCEDURE — 97803 MED NUTRITION INDIV SUBSEQ: CPT

## 2023-05-17 NOTE — PROGRESS NOTES
Patient's measurements today were:    Neck = 17 in    Waist = 49 In    Hips = 43 1/2 in    2023    Return Medical Weight Management Note     Franco Wheeler  MRN:  [id number]  :  1950      Dear iLndy Purdy,    I had the pleasure of doing a visit with your patient Franco Wheeler.  He is a 72 year old male who I am continuing to see for treatment of obesity.      Assessment & Plan   Problem List Items Addressed This Visit     Essential hypertension     BP is WNL           Class 1 obesity due to excess calories with serious comorbidity and body mass index (BMI) of 34.0 to 34.9 in adult     Patient was congratulated on wt loss success thus far. Healthy habits to assist with further weight loss were discussed. Franco will continue the Topiramate.  Will increase dose to 75 mg.  Id needed can mychart before follow up to increase to 100 mg.            Relevant Medications    topiramate (TOPAMAX) 25 MG tablet    Prediabetes - Primary     May improve with weight loss           MARTI (obstructive sleep apnea)     May improve with weight loss               PATIENT INSTRUCTIONS:  Increase Topiramate to 75 mg daily.  If needed and go but to 100 mg daily.  Just send my chart message.     Goals:  Start taking a brain break/walk around 4 PM.    FOLLOW-UP:    Please call [phone] to schedule your next visit in 3 months.      30 minutes spent on the date of the encounter doing chart review, history and exam, result review, counseling, developing plan of care, documentation, and further activities as noted      Franco returns for medical weight management follow up.  Last seen 2022.  We Increaseed Topiramate to 25 mg twice daily. Is approved for knee surgery.  Is walking so well with wt loss and using less Advil.    Goals:  Will take lunch to work- met  Will eat supper- met- met  Will work on plate method for eating.  Will increase activity from 1 hour 5x day a week of cardio with 15 minutes to  weight.  - not met.  Busy and was sick  Is taking vitamin D now.  - met       WEIGHT METRICS:  Body mass index is 35.4 kg/m .   Current  251 lb  Last Visits Weight: 261 lb (118.4 kg)  Initial Weight (lbs): 264 lbs  * % down from initial visit    Wt Readings from Last 10 Encounters:   02/15/23 261 lb (118.4 kg)   01/16/23 265 lb 6.4 oz (120.4 kg)   12/12/22 264 lb 8 oz (120 kg)   11/08/22 269 lb 1.6 oz (122.1 kg)   09/12/22 275 lb 4.8 oz (124.9 kg)        AOM:  Phentermine:   Pt has hx of arrhythmia.    Topiramate:     Will start today  GLP-1:              AOM not covered.  Will get lab today.   Naltrexone:      Consideration, Hx of alcohol abuse  Wellbutrin:       Hx of alcohol abuse, would avoid due to increase seizure threshold  Metformin:       Will get lab doday  Contrave:        AOM not covered.  Qsymia:           AOM not covered.         WEIGHT METRICS:  Body mass index is 34.04 kg/m .   Current Weight: 251 lb (113.9 kg)  Last Visits Weight: 264 lb 8 oz (120 kg)  Initial Weight (lbs): 275.3 lbs  Cumulative weight loss (lbs): 24.3  Weight Loss Percentage: 8.83%    Wt Readings from Last 10 Encounters:   05/22/23 251 lb (113.9 kg)   02/15/23 261 lb (118.4 kg)   01/16/23 265 lb 6.4 oz (120.4 kg)   12/12/22 264 lb 8 oz (120 kg)   11/08/22 269 lb 1.6 oz (122.1 kg)   09/12/22 275 lb 4.8 oz (124.9 kg)        Weight Loss Medication (AOM) History Reviewed       Current weight loss medication/s taking:    Topiramate   If not taking an AOM prescribed to you, please indicate why:    Any side effects from the medication?   None       Changes and Difficulties      Diet changes since last visit:   Less snacking, Packs 3 meals in grocery bag This works well   With regards to diet, pt still struggling with:   Ate a lot in April because of tax season, was sick in spring with URI and it knowed him down for 3 weeks. , has PB and a wassa crisp with PB at bedtime if still needed.   Activity changes since last visit:   PT for pre  surgery for knee.  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      MEDICATIONS:   Current Outpatient Medications   Medication     aspirin (ASA) 325 MG EC tablet     atorvastatin (LIPITOR) 40 MG tablet     escitalopram (LEXAPRO) 10 MG tablet     ibuprofen (ADVIL/MOTRIN) 800 MG tablet     losartan (COZAAR) 100 MG tablet     metoprolol succinate ER (TOPROL XL) 25 MG 24 hr tablet     multivitamin w/minerals (THERA-VIT-M) tablet     omeprazole (PRILOSEC) 20 MG DR capsule     polyethylene glycol (MIRALAX) 17 GM/Dose powder     sildenafil (REVATIO) 20 MG tablet     topiramate (TOPAMAX) 25 MG tablet     No current facility-administered medications for this visit.       LABS:  Hemoglobin A1C   Date Value Ref Range Status   10/25/2022 5.8 (H) 0.0 - 5.6 % Final     Comment:     Normal <5.7%   Prediabetes 5.7-6.4%    Diabetes 6.5% or higher     Note: Adopted from ADA consensus guidelines.     Vitamin D, Total (25-Hydroxy)   Date Value Ref Range Status   10/25/2022 27 20 - 75 ug/L Final     Sodium   Date Value Ref Range Status   10/25/2022 141 133 - 144 mmol/L Final     Potassium   Date Value Ref Range Status   10/25/2022 4.0 3.4 - 5.3 mmol/L Final     Chloride   Date Value Ref Range Status   10/25/2022 110 (H) 94 - 109 mmol/L Final     Carbon Dioxide (CO2)   Date Value Ref Range Status   10/25/2022 24 20 - 32 mmol/L Final     Anion Gap   Date Value Ref Range Status   10/25/2022 7 3 - 14 mmol/L Final     Glucose   Date Value Ref Range Status   10/25/2022 94 70 - 99 mg/dL Final     Urea Nitrogen   Date Value Ref Range Status   10/25/2022 17 7 - 30 mg/dL Final     Creatinine   Date Value Ref Range Status   10/25/2022 0.84 0.66 - 1.25 mg/dL Final     GFR Estimate   Date Value Ref Range Status   10/25/2022 >90 >60 mL/min/1.73m2 Final     Comment:     Effective December 21, 2021 eGFRcr in adults is calculated using the 2021 CKD-EPI creatinine equation which includes age and  gender (Mary et al., NEJ, DOI: 10.1056/WPZRln2999351)     Calcium   Date Value Ref Range Status   10/25/2022 8.6 8.5 - 10.1 mg/dL Final     Bilirubin Total   Date Value Ref Range Status   10/25/2022 0.4 0.2 - 1.3 mg/dL Final     Alkaline Phosphatase   Date Value Ref Range Status   10/25/2022 76 40 - 150 U/L Final     ALT   Date Value Ref Range Status   10/25/2022 28 0 - 70 U/L Final     AST   Date Value Ref Range Status   10/25/2022 16 0 - 45 U/L Final        BP Readings from Last 6 Encounters:   05/22/23 111/73   12/12/22 116/76   09/12/22 (!) 138/92       Pulse Readings from Last 6 Encounters:   05/22/23 73   12/12/22 55   09/12/22 74        PE:  /73 (BP Location: Left arm, Patient Position: Chair, Cuff Size: Adult Large)   Pulse 73   Ht 6' (1.829 m)   Wt 251 lb (113.9 kg)   SpO2 98%   BMI 34.04 kg/m    GENERAL: Healthy, alert and no distress  EYES: Eyes grossly normal to inspection.  No discharge or erythema, or obvious scleral/conjunctival abnormalities.  RESP: No audible wheeze, cough, or visible cyanosis.  No visible retractions or increased work of breathing.    SKIN: Visible skin clear. No significant rash, abnormal pigmentation or lesions.  NEURO: Cranial nerves grossly intact.  Mentation and speech appropriate for age.  PSYCH: Mentation appears normal, affect normal/bright, judgement and insight intact, normal speech and appearance well-groomed.    Sincerely,      Elisa Funez PA-C

## 2023-05-22 ENCOUNTER — OFFICE VISIT (OUTPATIENT)
Dept: SURGERY | Facility: CLINIC | Age: 73
End: 2023-05-22
Payer: COMMERCIAL

## 2023-05-22 VITALS
SYSTOLIC BLOOD PRESSURE: 111 MMHG | DIASTOLIC BLOOD PRESSURE: 73 MMHG | HEART RATE: 73 BPM | WEIGHT: 251 LBS | BODY MASS INDEX: 34 KG/M2 | OXYGEN SATURATION: 98 % | HEIGHT: 72 IN

## 2023-05-22 DIAGNOSIS — R73.03 PREDIABETES: Primary | ICD-10-CM

## 2023-05-22 DIAGNOSIS — I10 ESSENTIAL HYPERTENSION: ICD-10-CM

## 2023-05-22 DIAGNOSIS — E66.811 CLASS 1 OBESITY DUE TO EXCESS CALORIES WITH SERIOUS COMORBIDITY AND BODY MASS INDEX (BMI) OF 34.0 TO 34.9 IN ADULT: ICD-10-CM

## 2023-05-22 DIAGNOSIS — E66.01 MORBID OBESITY (H): ICD-10-CM

## 2023-05-22 DIAGNOSIS — G47.33 OSA (OBSTRUCTIVE SLEEP APNEA): ICD-10-CM

## 2023-05-22 DIAGNOSIS — E66.09 CLASS 1 OBESITY DUE TO EXCESS CALORIES WITH SERIOUS COMORBIDITY AND BODY MASS INDEX (BMI) OF 34.0 TO 34.9 IN ADULT: ICD-10-CM

## 2023-05-22 PROCEDURE — 99214 OFFICE O/P EST MOD 30 MIN: CPT | Performed by: PHYSICIAN ASSISTANT

## 2023-05-22 RX ORDER — TOPIRAMATE 25 MG/1
TABLET, FILM COATED ORAL
Qty: 270 TABLET | Refills: 1 | Status: SHIPPED | OUTPATIENT
Start: 2023-05-22

## 2023-05-22 NOTE — ASSESSMENT & PLAN NOTE
Patient was congratulated on wt loss success thus far. Healthy habits to assist with further weight loss were discussed. Franco will continue the Topiramate.  Will increase dose to 75 mg.  Id needed can mychart before follow up to increase to 100 mg.

## 2023-05-22 NOTE — PATIENT INSTRUCTIONS
"To ensure quality you may receive a patient satisfaction survey. The greatest compliment you can give is \"Likely to Recommend.\"    Nice to talk with you today.  Thank you for your trust. Below is the plan discussed.-  EITAN Pérez      Plan:    Increase Topiramate to 75 mg daily.  If needed and go but to 100 mg daily.  Just send my chart message.     Goals:  Start taking a brain break/walk around 4 PM.    FOLLOW-UP:    Please call [phone] to schedule your next visit in 3 months.      10 Healthy Habits  Eat Better ? Move More ? Live Well   Eat breakfast daily.     Try to eat within the first hour after you wake up. This helps you control your appetite during the day, and you are less likely to snack in the evening.    80% of people who skip meals are overweight or obese.    2.   Include protein with every meal, even breakfast.    Protein will suppress your appetite longer than other types of food. This helps you  feel more satisfied with the amount of food you have eaten.    3.  Fill up on Fiber   Fiber comes from plants--fruits, veggies, whole grains, nuts/seeds and beans   Fiber is low in calories, high in phytonutrients and helps you stay full longer    4.  Eat S-L-O-W-L-Y   Take 20-30 minutes to eat each meal by taking small bites, chewing foods to applesauce consistency or 20-30 times before you swallow   Eating foods too fast can delay satiety/fullness signals and increase overeating     5.  Avoid Mindless Eating   Be present when you eat--take note of the smell, taste and quality of your food   Make a list of alternative activities you could do to prevent boredom/stress eating  Go for a walk, call a friend, chew gum, paint your nails    6.  Keep a Journal   Writing down what you eat, how you feel and when you are active helps you identify new changes to work on from week to week         Look for ways to cut 100 calories from your current diet 2-3 times per day    7.  Drink 64 ounces of Non Calorie drinks " between meals   Water   Zero calorie Propel , Vitamin Water , Crystal Light    Avoid regular soda pop    8.  Stop thinking about food choices as a  diet.    Instead, think of them as healthy, lifelong habits--an effort toward a new way of eating.   Weigh yourself once a week instead of everyday.     9.  Get enough sleep--at least 7 to 8 hours each night.    Lack of sleep is one reason that fat builds up around the waist.    10.  Exercise five to six times per week    Do a combination aerobic exercise (fast walking, biking, swimming) and strength training (Dumbbells, pushups, weight machines, resistance bands).   Start at 10 minutes per day and slowly work your way up to 30 minutes or more.  Taking the stairs instead of the elevator, park at the far end of the parking lot, pace while on the phone, take the dog for a walk.     [email or web address]

## 2023-08-07 ENCOUNTER — LAB REQUISITION (OUTPATIENT)
Dept: LAB | Facility: HOSPITAL | Age: 73
End: 2023-08-07
Payer: COMMERCIAL

## 2023-08-07 DIAGNOSIS — Z47.1 AFTERCARE FOLLOWING JOINT REPLACEMENT SURGERY: ICD-10-CM

## 2023-08-07 LAB
ANION GAP SERPL CALCULATED.3IONS-SCNC: 10 MMOL/L (ref 7–15)
BUN SERPL-MCNC: 17.4 MG/DL (ref 8–23)
CALCIUM SERPL-MCNC: 9.6 MG/DL (ref 8.8–10.2)
CHLORIDE SERPL-SCNC: 110 MMOL/L (ref 98–107)
CREAT SERPL-MCNC: 1.14 MG/DL (ref 0.67–1.17)
DEPRECATED HCO3 PLAS-SCNC: 21 MMOL/L (ref 22–29)
GFR SERPL CREATININE-BSD FRML MDRD: 68 ML/MIN/1.73M2
GLUCOSE SERPL-MCNC: 98 MG/DL (ref 70–99)
POTASSIUM SERPL-SCNC: 4.3 MMOL/L (ref 3.4–5.3)
SODIUM SERPL-SCNC: 141 MMOL/L (ref 136–145)

## 2023-08-07 PROCEDURE — 36415 COLL VENOUS BLD VENIPUNCTURE: CPT | Mod: ORL | Performed by: NURSE PRACTITIONER

## 2023-08-07 PROCEDURE — 80048 BASIC METABOLIC PNL TOTAL CA: CPT | Mod: ORL | Performed by: NURSE PRACTITIONER

## 2023-09-02 NOTE — PROGRESS NOTES
2023      Return Medical Weight Management Note     Franco Wheeler  MRN:  [id number]  :  1950    Dear Lindy Purdy MD,    I had the pleasure of seeing your patient Franco Wheeler. He is a 72 year old male who I am continuing to see for treatment of obesity.      Assessment & Plan   Problem List Items Addressed This Visit       Essential hypertension     Controlled on medication         Morbid obesity (H) - Primary     Patient was congratulated on wt loss success thus far. Healthy habits to assist with further weight loss were discussed. Franco will continue Topiramate.  He is down 26 lbs/ 9% since his initial eval 1 year ago.  Discussed Phentermine.  If weight stalls despite increase in exercise and protein intake, can mychart to start Phentermine. Goals noted in pt instructions.            MARTI (obstructive sleep apnea)     Has improved with weight loss.              PATIENT INSTRUCTIONS:  Continue Topiramate 75 mg daily  Send message if you feel you need to start Phentermine before next visit.    Goals:  Add protein at lunch   Bring Muscle Milk to work    FOLLOW-UP:    Please call [phone] to schedule your next visit in 3 months and with the dietician in October.      30 minutes spent on the date of the encounter doing chart review, history and exam, result review, counseling, developing plan of care, documentation, and further activities as noted      INTERVAL HISTORY:  Franco returns for medical weight management follow up.  Last seen on 2023.  Is on Topiramate to 75 mg daily. It helps with his might eating. Gave up alcohol 4 years ago.   Now that he is through his R TKA surgery he will be cleared to return to exercise in Sept. Wants to get back to previous level of strength and cardio.  Would like to save his left knee.  Wants to lose more weight.  The first 40 lbs of weight loss helped his sleep apnea.      In his office his office orders out for lunch- is now ordering a  salad    Diet Recall:  (2000 calorie day)  Breakfast: Muscle milk for breakfast  Lunch: Salad   Supper: Apple, Protein  This helps him avoid snacking at night.     Sometimes he works at night and doesn't go home, so he has unplanned meals.  May get a plain hamburger and then goes to bed.  This helps him avoid snacking when he gets home.         WEIGHT METRICS:  Body mass index is 33.77 kg/m .   Current Weight: 249 lb (112.9 kg)  Last Visits Weight: 251 lb (113.9 kg)  Initial Weight (lbs): 275 lbs  Cumulative weight loss (lbs): 26  Weight Loss Percentage: 9.45%    Wt Readings from Last 10 Encounters:   09/07/23 249 lb (112.9 kg)   05/22/23 251 lb (113.9 kg)   02/15/23 261 lb (118.4 kg)   01/16/23 265 lb 6.4 oz (120.4 kg)   12/12/22 264 lb 8 oz (120 kg)   11/08/22 269 lb 1.6 oz (122.1 kg)   09/12/22 275 lb 4.8 oz (124.9 kg)        MEDICATIONS:   Current Outpatient Medications   Medication Sig Dispense Refill    aspirin (ASA) 325 MG EC tablet Take 325 mg by mouth      atorvastatin (LIPITOR) 40 MG tablet TAKE 1 TABLET BY MOUTH IN THE MORNING      escitalopram (LEXAPRO) 10 MG tablet TAKE 1 TABLET BY MOUTH ONE TIME DAILY      ibuprofen (ADVIL/MOTRIN) 800 MG tablet Take 800 mg by mouth      losartan (COZAAR) 100 MG tablet Take 100 mg by mouth      metoprolol succinate ER (TOPROL XL) 25 MG 24 hr tablet Take 25 mg by mouth      multivitamin w/minerals (THERA-VIT-M) tablet Take 1 tablet by mouth      omeprazole (PRILOSEC) 20 MG DR capsule TAKE 1 CAPSULE BY MOUTH ONE TIME DAILY      polyethylene glycol (MIRALAX) 17 GM/Dose powder Take 17 g by mouth      sildenafil (REVATIO) 20 MG tablet Take 20-60 mg by mouth      topiramate (TOPAMAX) 25 MG tablet Takes 75 mg a day 270 tablet 1       LABS:  Hemoglobin A1C   Date Value Ref Range Status   10/25/2022 5.8 (H) 0.0 - 5.6 % Final     Comment:     Normal <5.7%   Prediabetes 5.7-6.4%    Diabetes 6.5% or higher     Note: Adopted from ADA consensus guidelines.     Vitamin D, Total  (25-Hydroxy)   Date Value Ref Range Status   10/25/2022 27 20 - 75 ug/L Final     TSH   Date Value Ref Range Status   10/25/2022 1.21 0.40 - 4.00 mU/L Final     Sodium   Date Value Ref Range Status   08/07/2023 141 136 - 145 mmol/L Final     Potassium   Date Value Ref Range Status   08/07/2023 4.3 3.4 - 5.3 mmol/L Final   10/25/2022 4.0 3.4 - 5.3 mmol/L Final     Chloride   Date Value Ref Range Status   08/07/2023 110 (H) 98 - 107 mmol/L Final   10/25/2022 110 (H) 94 - 109 mmol/L Final     Carbon Dioxide (CO2)   Date Value Ref Range Status   08/07/2023 21 (L) 22 - 29 mmol/L Final   10/25/2022 24 20 - 32 mmol/L Final     Anion Gap   Date Value Ref Range Status   08/07/2023 10 7 - 15 mmol/L Final   10/25/2022 7 3 - 14 mmol/L Final     Glucose   Date Value Ref Range Status   08/07/2023 98 70 - 99 mg/dL Final   10/25/2022 94 70 - 99 mg/dL Final     Urea Nitrogen   Date Value Ref Range Status   08/07/2023 17.4 8.0 - 23.0 mg/dL Final   10/25/2022 17 7 - 30 mg/dL Final     Creatinine   Date Value Ref Range Status   08/07/2023 1.14 0.67 - 1.17 mg/dL Final     GFR Estimate   Date Value Ref Range Status   08/07/2023 68 >60 mL/min/1.73m2 Final     Calcium   Date Value Ref Range Status   08/07/2023 9.6 8.8 - 10.2 mg/dL Final     Bilirubin Total   Date Value Ref Range Status   10/25/2022 0.4 0.2 - 1.3 mg/dL Final     Alkaline Phosphatase   Date Value Ref Range Status   10/25/2022 76 40 - 150 U/L Final     ALT   Date Value Ref Range Status   10/25/2022 28 0 - 70 U/L Final     AST   Date Value Ref Range Status   10/25/2022 16 0 - 45 U/L Final         BP Readings from Last 6 Encounters:   09/07/23 120/75   05/22/23 111/73   12/12/22 116/76   09/12/22 (!) 138/92       Pulse Readings from Last 6 Encounters:   09/07/23 79   05/22/23 73   12/12/22 55   09/12/22 74       PE:  /75 (BP Location: Right arm, Patient Position: Chair, Cuff Size: Adult Large)   Pulse 79   Ht 6' (1.829 m)   Wt 249 lb (112.9 kg)   SpO2 98%   BMI 33.77  kg/m    GENERAL: Healthy, alert and no distress  EYES: Eyes grossly normal to inspection.  No discharge or erythema, or obvious scleral/conjunctival abnormalities.  CV: Regular rate and rhythm without murmurs, rubs, or gallops.  RESP: Lungs are clear to auscultation bilaterally with good breath sounds. No audible wheeze, cough, or visible cyanosis.  No visible retractions or increased work of breathing.    SKIN: Visible skin clear. No significant rash, abnormal pigmentation or lesions.  NEURO: Cranial nerves grossly intact.  Mentation and speech appropriate for age.  PSYCH: Mentation appears normal, affect normal/bright, judgement and insight intact, normal speech and appearance well-groomed.      Sincerely,      Elisa Funez PA-C

## 2023-09-06 ENCOUNTER — TELEPHONE (OUTPATIENT)
Dept: SURGERY | Facility: CLINIC | Age: 73
End: 2023-09-06
Payer: COMMERCIAL

## 2023-09-06 NOTE — TELEPHONE ENCOUNTER
I Called patient at primary phone number and left message to complete questionnaire prior to appointment.   Helen De Santiago, CMA

## 2023-09-07 ENCOUNTER — OFFICE VISIT (OUTPATIENT)
Dept: SURGERY | Facility: CLINIC | Age: 73
End: 2023-09-07
Payer: COMMERCIAL

## 2023-09-07 VITALS
HEART RATE: 79 BPM | SYSTOLIC BLOOD PRESSURE: 120 MMHG | OXYGEN SATURATION: 98 % | WEIGHT: 249 LBS | DIASTOLIC BLOOD PRESSURE: 75 MMHG | BODY MASS INDEX: 33.72 KG/M2 | HEIGHT: 72 IN

## 2023-09-07 DIAGNOSIS — E66.811 CLASS 1 OBESITY DUE TO EXCESS CALORIES WITH SERIOUS COMORBIDITY AND BODY MASS INDEX (BMI) OF 34.0 TO 34.9 IN ADULT: ICD-10-CM

## 2023-09-07 DIAGNOSIS — I10 ESSENTIAL HYPERTENSION: ICD-10-CM

## 2023-09-07 DIAGNOSIS — G47.33 OSA (OBSTRUCTIVE SLEEP APNEA): ICD-10-CM

## 2023-09-07 DIAGNOSIS — E66.01 MORBID OBESITY (H): Primary | ICD-10-CM

## 2023-09-07 DIAGNOSIS — E66.09 CLASS 1 OBESITY DUE TO EXCESS CALORIES WITH SERIOUS COMORBIDITY AND BODY MASS INDEX (BMI) OF 34.0 TO 34.9 IN ADULT: ICD-10-CM

## 2023-09-07 PROCEDURE — 99214 OFFICE O/P EST MOD 30 MIN: CPT | Performed by: PHYSICIAN ASSISTANT

## 2023-09-07 RX ORDER — TOPIRAMATE 25 MG/1
TABLET, FILM COATED ORAL
Qty: 270 TABLET | Refills: 1 | Status: CANCELLED | OUTPATIENT
Start: 2023-09-07

## 2023-09-07 NOTE — PATIENT INSTRUCTIONS
"To ensure quality you may receive a patient satisfaction survey. The greatest compliment you can give is \"Likely to Recommend.\"    Nice to talk with you today.  Thank you for your trust. Below is the plan discussed.-  EITAN Pérez      Plan:  Continue Topiramate 75 mg daily  Send message if you feel you need to start Phentermine before next visit.    Goals:  Add protein at lunch   Bring Muscle Milk to work    FOLLOW-UP:    Please call [phone] to schedule your next visit in 3 months.     PHENTERMINE    We are starting Phentermine.  Our patients on Phentermine find that they:    >feel less hunger    >find it easier to push the plate away   >have an easier time eating less    For some of our patients, these feelings are very real and immediate. For other patients, the feelings are less obvious. They don't feel much of a change but find they've lost weight. Like all weight loss medications, Phentermine  works best when you help it work. This means:  1. Having less tempting high calorie (fattening) food around the house or office. (For people with strong cravings this is very important.)   2. Staying away from situations or people that may trigger your cravings .   3. Eating out only one time or less each week.  4. Eating your meals at a table with the TV or computer off.    Side-effects. Phentermine is generally well tolerated. The most common side effects are dry mouth and constipation. Because it is a stimulant, pts can have feelings of racing pulse or rapid heart beat. Some people can get an elevated blood pressure. Because of this we ask you to get your blood pressure and pulse checked within 1-2 weeks of starting the medication.           "

## 2023-09-07 NOTE — ASSESSMENT & PLAN NOTE
Patient was congratulated on wt loss success thus far. Healthy habits to assist with further weight loss were discussed. Franco will continue Topiramate.  He is down 26 lbs/ 9% since his initial eval 1 year ago.  Discussed Phentermine.  If weight stalls despite increase in exercise and protein intake, can mychart to start Phentermine. Goals noted in pt instructions.